# Patient Record
Sex: FEMALE | Race: WHITE | NOT HISPANIC OR LATINO | Employment: FULL TIME | URBAN - METROPOLITAN AREA
[De-identification: names, ages, dates, MRNs, and addresses within clinical notes are randomized per-mention and may not be internally consistent; named-entity substitution may affect disease eponyms.]

---

## 2024-09-09 ENCOUNTER — APPOINTMENT (OUTPATIENT)
Dept: CT IMAGING | Facility: HOSPITAL | Age: 24
End: 2024-09-09
Payer: COMMERCIAL

## 2024-09-09 ENCOUNTER — HOSPITAL ENCOUNTER (EMERGENCY)
Facility: HOSPITAL | Age: 24
Discharge: HOME | End: 2024-09-09
Attending: EMERGENCY MEDICINE | Admitting: EMERGENCY MEDICINE
Payer: COMMERCIAL

## 2024-09-09 VITALS
RESPIRATION RATE: 16 BRPM | TEMPERATURE: 98.5 F | WEIGHT: 150 LBS | HEART RATE: 70 BPM | SYSTOLIC BLOOD PRESSURE: 101 MMHG | OXYGEN SATURATION: 100 % | HEIGHT: 68 IN | DIASTOLIC BLOOD PRESSURE: 54 MMHG | BODY MASS INDEX: 22.73 KG/M2

## 2024-09-09 DIAGNOSIS — R51.9 NONINTRACTABLE HEADACHE, UNSPECIFIED CHRONICITY PATTERN, UNSPECIFIED HEADACHE TYPE: Primary | ICD-10-CM

## 2024-09-09 PROCEDURE — 96374 THER/PROPH/DIAG INJ IV PUSH: CPT

## 2024-09-09 PROCEDURE — 25010000002 KETOROLAC TROMETHAMINE PER 15 MG: Performed by: NURSE PRACTITIONER

## 2024-09-09 PROCEDURE — 99284 EMERGENCY DEPT VISIT MOD MDM: CPT | Performed by: NURSE PRACTITIONER

## 2024-09-09 PROCEDURE — 25010000002 DIPHENHYDRAMINE PER 50 MG: Performed by: NURSE PRACTITIONER

## 2024-09-09 PROCEDURE — 96361 HYDRATE IV INFUSION ADD-ON: CPT

## 2024-09-09 PROCEDURE — 99284 EMERGENCY DEPT VISIT MOD MDM: CPT

## 2024-09-09 PROCEDURE — 25810000003 SODIUM CHLORIDE 0.9 % SOLUTION: Performed by: NURSE PRACTITIONER

## 2024-09-09 PROCEDURE — 25010000002 PROCHLORPERAZINE 10 MG/2ML SOLUTION: Performed by: NURSE PRACTITIONER

## 2024-09-09 PROCEDURE — 70450 CT HEAD/BRAIN W/O DYE: CPT

## 2024-09-09 PROCEDURE — 96375 TX/PRO/DX INJ NEW DRUG ADDON: CPT

## 2024-09-09 RX ORDER — SODIUM CHLORIDE 0.9 % (FLUSH) 0.9 %
10 SYRINGE (ML) INJECTION AS NEEDED
Status: DISCONTINUED | OUTPATIENT
Start: 2024-09-09 | End: 2024-09-09 | Stop reason: HOSPADM

## 2024-09-09 RX ORDER — DIPHENHYDRAMINE HYDROCHLORIDE 50 MG/ML
25 INJECTION INTRAMUSCULAR; INTRAVENOUS ONCE
Status: COMPLETED | OUTPATIENT
Start: 2024-09-09 | End: 2024-09-09

## 2024-09-09 RX ORDER — KETOROLAC TROMETHAMINE 30 MG/ML
30 INJECTION, SOLUTION INTRAMUSCULAR; INTRAVENOUS ONCE
Status: COMPLETED | OUTPATIENT
Start: 2024-09-09 | End: 2024-09-09

## 2024-09-09 RX ORDER — PROCHLORPERAZINE EDISYLATE 5 MG/ML
10 INJECTION INTRAMUSCULAR; INTRAVENOUS ONCE
Status: COMPLETED | OUTPATIENT
Start: 2024-09-09 | End: 2024-09-09

## 2024-09-09 RX ADMIN — SODIUM CHLORIDE 1000 ML: 9 INJECTION, SOLUTION INTRAVENOUS at 19:14

## 2024-09-09 RX ADMIN — DIPHENHYDRAMINE HYDROCHLORIDE 25 MG: 50 INJECTION, SOLUTION INTRAMUSCULAR; INTRAVENOUS at 19:14

## 2024-09-09 RX ADMIN — KETOROLAC TROMETHAMINE 30 MG: 30 INJECTION, SOLUTION INTRAMUSCULAR at 19:14

## 2024-09-09 RX ADMIN — PROCHLORPERAZINE EDISYLATE 10 MG: 5 INJECTION INTRAMUSCULAR; INTRAVENOUS at 19:14

## 2024-09-09 NOTE — Clinical Note
[facility] FSED [place] [id number] [place] [address] [place] [address]  Phone: [phone]    Danni Harper was seen and treated in our emergency department on 9/9/2024.  She may return to school on 09/11/2024.          Thank you for choosing [facility].    Sri Hale APRN

## 2024-09-09 NOTE — FSED PROVIDER NOTE
Subjective   History of Present Illness  The patient is a 24-year-old female who presents to the ER with a migraine headache that started on Saturday.  Patient reports the medicine that she takes has not helped this time.  Patient reports has had some nausea no vomiting.  Patient denies any fevers.  Patient reports she does have light sensitivity.    History provided by:  Patient   used: No        Review of Systems   Neurological:  Positive for headaches.       No past medical history on file.    Allergies   Allergen Reactions    Sulfa Antibiotics Rash       No past surgical history on file.    No family history on file.    Social History     Socioeconomic History    Marital status: Single           Objective   Physical Exam  Vitals and nursing note reviewed.   Constitutional:       Appearance: Normal appearance.   HENT:      Head: Normocephalic.      Right Ear: Tympanic membrane and ear canal normal.      Left Ear: Tympanic membrane and ear canal normal.      Nose: Nose normal.      Mouth/Throat:      Lips: Pink.      Mouth: Mucous membranes are moist.      Pharynx: Oropharynx is clear. Uvula midline.   Eyes:      Conjunctiva/sclera: Conjunctivae normal.      Pupils: Pupils are equal, round, and reactive to light.   Cardiovascular:      Rate and Rhythm: Normal rate and regular rhythm.      Pulses: Normal pulses.      Heart sounds: Normal heart sounds.   Pulmonary:      Effort: Pulmonary effort is normal.      Breath sounds: Normal breath sounds.   Abdominal:      General: Bowel sounds are normal.      Palpations: Abdomen is soft.   Musculoskeletal:         General: Normal range of motion.      Cervical back: Full passive range of motion without pain, normal range of motion and neck supple.   Skin:     General: Skin is warm and dry.   Neurological:      General: No focal deficit present.      Mental Status: She is alert and oriented to person, place, and time.      Sensory: Sensation is intact.       Motor: Motor function is intact.      Coordination: Coordination is intact.      Gait: Gait is intact.   Psychiatric:         Mood and Affect: Mood normal.         Behavior: Behavior normal. Behavior is cooperative.         Procedures           ED Course  ED Course as of 09/09/24 2222   Mon Sep 09, 2024   1953 CT HEAD WO CONTRAST     Date of Exam: 9/9/2024 7:07 PM EDT     Indication: headache, hx of migraine, worse now..     Comparison: None available.     Technique: Axial CT images were obtained of the head without contrast administration.  Coronal reconstructions were performed.  Automated exposure control and iterative reconstruction methods were used.        Findings:  No acute intracranial hemorrhage or extra-axial collection is identified. The ventricles appear normal in caliber, with no evidence of mass effect or midline shift. The basal cisterns appear patent. The gray-white differentiation appears preserved.     The calvarium appear intact. The paranasal sinuses are clear. The mastoid air cells are well-aerated.     IMPRESSION:  Impression:  No acute intracranial process identified.      [DS]   2023 Patietn  [DS]      ED Course User Index  [DS] Sri Hale APRN                                           Medical Decision Making  The patient is a 24-year-old female who presents to the ER with a migraine headache that started on Saturday.  Patient reports the medicine that she takes has not helped this time.  Patient reports has had some nausea no vomiting.  Patient denies any fevers.  Patient reports she does have light sensitivity.    Head CT is unremarkable.    This patient presents with a headache most consistent with benign headache from either tension type headache vs migraine. No headache red flags. Neurologic exam without evidence of meningismus, AMS, focal neurologic findings so doubt meningitis, encephalitis, stroke. Presentation not consistent with acute intracranial bleed to include  SAH (lack of risk factors, headache history). No history of trauma so doubt ICH. Given history and physical temporal arteritis unlikely, as is acute angle closure glaucoma. Doubt carotid artery dissection given no focal neuro deficits, no neck trauma or recent neck strain. Patient with no signs of increased intracranial pressure or weight loss and history and physical suggest more benign headache so less likely mass effect in brain from tumor or abscess or idiopathic intracranial hypertension. Pain was controlled with IV fluids, Toradol, Benadryl, Compazine and patient discharged home with PMD follow up.    Problems Addressed:  Nonintractable headache, unspecified chronicity pattern, unspecified headache type: complicated acute illness or injury    Amount and/or Complexity of Data Reviewed  Radiology: ordered. Decision-making details documented in ED Course.    Risk  OTC drugs.        Final diagnoses:   Nonintractable headache, unspecified chronicity pattern, unspecified headache type       ED Disposition  ED Disposition       ED Disposition   Discharge    Condition   Stable    Comment   --               Johanne Johnston MD  [address] [place], [place] [id number] [phone]    Schedule an appointment as soon as possible for a visit in 1 week  As needed, If symptoms worsen         Medication List      No changes were made to your prescriptions during this visit.

## 2024-09-09 NOTE — Clinical Note
[facility] FSED [place] [id number] [place] [address] [place] [address]  Phone: [phone]    Danni Harper was seen and treated in our emergency department on 9/9/2024.  She may return to work on 09/11/2024.         Thank you for choosing [facility].    Sri Hael APRN

## 2024-09-10 NOTE — DISCHARGE INSTRUCTIONS
Call for a follow up appointment with your primary care for further evaluation and treatment.  Call your physician tomorrow and let her know about     Go home and rest.    Tylenol/Motrin as needed for pain/fevers    Make sure patient is drinking plenty of fluids.    Return for any new or worsening symptoms.      If you have increased fevers that do not respond to Tylenol or Motrin, if you develop nausea, vomiting  then you should be reevaluated.